# Patient Record
Sex: MALE | Race: WHITE | Employment: FULL TIME | URBAN - METROPOLITAN AREA
[De-identification: names, ages, dates, MRNs, and addresses within clinical notes are randomized per-mention and may not be internally consistent; named-entity substitution may affect disease eponyms.]

---

## 2022-09-27 ENCOUNTER — HOSPITAL ENCOUNTER (EMERGENCY)
Age: 22
Discharge: HOME | End: 2022-09-27
Payer: COMMERCIAL

## 2022-09-27 VITALS
DIASTOLIC BLOOD PRESSURE: 76 MMHG | HEART RATE: 62 BPM | HEIGHT: 64 IN | TEMPERATURE: 98.2 F | WEIGHT: 130 LBS | OXYGEN SATURATION: 100 % | RESPIRATION RATE: 12 BRPM | SYSTOLIC BLOOD PRESSURE: 120 MMHG | BODY MASS INDEX: 22.2 KG/M2

## 2022-09-27 DIAGNOSIS — S01.01XA LACERATION OF SCALP, INITIAL ENCOUNTER: Primary | ICD-10-CM

## 2022-09-27 PROCEDURE — 90715 TDAP VACCINE 7 YRS/> IM: CPT | Performed by: PHYSICIAN ASSISTANT

## 2022-09-27 PROCEDURE — 6360000002 HC RX W HCPCS: Performed by: PHYSICIAN ASSISTANT

## 2022-09-27 PROCEDURE — 90471 IMMUNIZATION ADMIN: CPT | Performed by: PHYSICIAN ASSISTANT

## 2022-09-27 PROCEDURE — 12001 RPR S/N/AX/GEN/TRNK 2.5CM/<: CPT

## 2022-09-27 PROCEDURE — 99284 EMERGENCY DEPT VISIT MOD MDM: CPT

## 2022-09-27 RX ADMIN — TETANUS TOXOID, REDUCED DIPHTHERIA TOXOID AND ACELLULAR PERTUSSIS VACCINE, ADSORBED 0.5 ML: 5; 2.5; 8; 8; 2.5 SUSPENSION INTRAMUSCULAR at 13:27

## 2022-09-27 ASSESSMENT — ENCOUNTER SYMPTOMS
RESPIRATORY NEGATIVE: 1
GASTROINTESTINAL NEGATIVE: 1

## 2022-09-27 ASSESSMENT — PAIN SCALES - GENERAL: PAINLEVEL_OUTOF10: 6

## 2022-09-27 ASSESSMENT — PAIN DESCRIPTION - DESCRIPTORS: DESCRIPTORS: ACHING

## 2022-09-27 ASSESSMENT — PAIN DESCRIPTION - FREQUENCY: FREQUENCY: CONTINUOUS

## 2022-09-27 ASSESSMENT — PAIN DESCRIPTION - ONSET: ONSET: ON-GOING

## 2022-09-27 ASSESSMENT — PAIN - FUNCTIONAL ASSESSMENT: PAIN_FUNCTIONAL_ASSESSMENT: 0-10

## 2022-09-27 ASSESSMENT — PAIN DESCRIPTION - LOCATION: LOCATION: HEAD

## 2022-09-27 NOTE — ED PROVIDER NOTES
Emergency Department Provider Note  Location: [facility]  ED  9/27/2022     Patient Identification  Meenakshi Arenas is a 19 y.o. male    Chief Complaint  Head Laceration (Pt was working under car this morning and sat upright too fast. Hit top of head on car frame. Denies pain anywhere other than location of wound, denies LOC or blood thinners.)          HPI  (History provided by patient)  Patient presents with head laceration. He states he was working on his car and lifted his head too high and cut it on metal under the car. He denies LOC. He denies pain, dizziness, NV, or any other associated symptoms. He reports he does not know his last tetanus vaccine date. I have reviewed the following nursing documentation:  Allergies: No Known Allergies    Past medical history:  has no past medical history on file. Past surgical history:  has no past surgical history on file. Home medications:   Prior to Admission medications    Not on File       Social history:  reports that he has never smoked. He has never used smokeless tobacco. He reports current alcohol use. He reports that he does not use drugs. Family history:  No family history on file. ROS  Review of Systems   Constitutional: Negative. HENT: Negative. Eyes:  Negative for visual disturbance. Respiratory: Negative. Cardiovascular: Negative. Gastrointestinal: Negative. Genitourinary: Negative. Musculoskeletal: Negative. Skin:         Laceration to top of scalp    Neurological:  Negative for dizziness, syncope, light-headedness, numbness and headaches. Hematological: Negative. Exam  ED Triage Vitals [09/27/22 1213]   BP Temp Temp Source Heart Rate Resp SpO2 Height Weight   120/76 98.2 °F (36.8 °C) Oral 62 12 100 % 5' 4\" (1.626 m) 130 lb (59 kg)       Physical Exam  Vitals reviewed. Constitutional:       General: He is not in acute distress. Appearance: Normal appearance.    HENT:      Head: concern for intracranial trauma, skull fracture, coagulopathy, infection, or retained foreign body. Patient was giving follow up and return to ER precautions. Clinical Impression:  1. Laceration of scalp, initial encounter          Disposition:  Discharge to home in good condition. Blood pressure 120/76, pulse 62, temperature 98.2 °F (36.8 °C), temperature source Oral, resp. rate 12, height 5' 4\" (1.626 m), weight 130 lb (59 kg), SpO2 100 %. Patient was given scripts for the following medications. I counseled patient how to take these medications. There are no discharge medications for this patient. Disposition referral (if applicable): Jamaica Wright DO  [address] [address]  [phone]    Schedule an appointment as soon as possible for a visit   For wound re-check in 2-3 days, For suture removal in 7-10 days. [facility]  ED  [address] [facility]  [address]  [phone]    If symptoms worsen      Total critical care time is 0 minutes, which excludes separately billable procedures and updating family. Time spent is specifically for management of the presenting complaint and symptoms initially, direct bedside care, reevaluation, review of records, and consultation. There was a high probability of clinically significant life-threatening deterioration in the patient's condition, which required my urgent intervention. This chart was generated in part by using Dragon Dictation system and may contain errors related to that system including errors in grammar, punctuation, and spelling, as well as words and phrases that may be inappropriate. If there are any questions or concerns please feel free to contact the dictating provider for clarification.         Anusha Chavez  09/27/22 3810

## 2022-09-27 NOTE — ED PROVIDER NOTES
Patient seen and evaluated in collaboration with medical student without involvement of attending physician. Patient being assessed for scalp laceration sustained on sharp edge of car. He denies significant head injury otherwise. No vomiting. No visual changes. Is having some discomfort at site. No neck or back pain. Unaware of last tetanus update. Uses no blood thinners. The wounds was cleansed and closed without complication. Procedure directly supervised by myself. Discharged home with recommendation for wound aftercare. Tetanus booster updated. Return precautions discussed and patient in agreement and comfortable at discharge. I estimate there is LOW risk for CELLULITIS, COMPARTMENT SYNDROME, NECROTIZING FASCIITIS, TENDON OR NEUROVASCULAR INJURY, or FOREIGN BODY, thus I consider the discharge disposition reasonable. Also, there is no evidence or peritonitis, sepsis, or toxicity. Lenin Meneses and I have discussed the diagnosis and risks, and we agree with discharging home to follow-up with their primary doctor. We also discussed returning to the Emergency Department immediately if new or worsening symptoms occur. We have discussed the symptoms which are most concerning (e.g., changing or worsening pain, fever, numbness, weakness, cool or painful digits) that necessitate immediate return. Final Impression  1. Laceration of scalp, initial encounter        Discharge Vital Signs:  Blood pressure 120/76, pulse 62, temperature 98.2 °F (36.8 °C), temperature source Oral, resp. rate 12, height 5' 4\" (1.626 m), weight 130 lb (59 kg), SpO2 100 %.        BETH Palma  09/27/22 [facility]  09/30/22 7670

## 2022-09-27 NOTE — ED NOTES
Pt instructed to follow up with PCP for staple removal. Assessed per Claudine CAMPOS.      Hai Newberry LPN  74/31/75 [id number]

## 2022-10-07 ENCOUNTER — HOSPITAL ENCOUNTER (EMERGENCY)
Age: 22
Discharge: HOME | End: 2022-10-07

## 2022-10-07 VITALS
SYSTOLIC BLOOD PRESSURE: 135 MMHG | RESPIRATION RATE: 14 BRPM | TEMPERATURE: 99.4 F | DIASTOLIC BLOOD PRESSURE: 68 MMHG | HEART RATE: 74 BPM | OXYGEN SATURATION: 99 %

## 2022-10-07 NOTE — ED NOTES
2 stple removed from scalp without complication. Wound healing appropriately.       Mary Ireland RN  10/07/22 4824